# Patient Record
Sex: MALE | Race: BLACK OR AFRICAN AMERICAN | NOT HISPANIC OR LATINO | URBAN - NONMETROPOLITAN AREA
[De-identification: names, ages, dates, MRNs, and addresses within clinical notes are randomized per-mention and may not be internally consistent; named-entity substitution may affect disease eponyms.]

---

## 2021-06-25 ENCOUNTER — IMPORTED ENCOUNTER (OUTPATIENT)
Dept: URBAN - NONMETROPOLITAN AREA CLINIC 1 | Facility: CLINIC | Age: 51
End: 2021-06-25

## 2021-06-25 PROBLEM — S05.02XA: Noted: 2021-06-25

## 2021-06-25 PROCEDURE — 92071 CONTACT LENS FITTING FOR TX: CPT

## 2021-06-25 PROCEDURE — 99204 OFFICE O/P NEW MOD 45 MIN: CPT

## 2021-06-25 NOTE — PATIENT DISCUSSION
*CORNEAL ABRASION:. os-  Discussed findings of exam in detail with the patient. -  Discussed the acute nature and treatment options with the patient. -  Discussed the importance of follow-up and the risk of serious infection   The patient was warned to return to the office immediately if they experience loss of vision or increased pain. insert bcl osstart maxitrol 1gtt qid os x 1wk

## 2021-06-28 ENCOUNTER — IMPORTED ENCOUNTER (OUTPATIENT)
Dept: URBAN - NONMETROPOLITAN AREA CLINIC 1 | Facility: CLINIC | Age: 51
End: 2021-06-28

## 2021-06-28 PROCEDURE — 99213 OFFICE O/P EST LOW 20 MIN: CPT

## 2021-06-28 NOTE — PATIENT DISCUSSION
*CORNEAL ABRASION:. os resolved-  Discussed findings of exam in detail with the patient. -  Discussed the acute nature and treatment options with the patient. -  Discussed the importance of follow-up and the risk of serious infection   The patient was warned to return to the office immediately if they experience loss of vision or increased pain. removed bcl osd/c gtts

## 2022-04-15 ASSESSMENT — VISUAL ACUITY
OS_CC: 20/400
OD_PH: 20/400
OD_CC: CF2'
OS_PH: 20/400